# Patient Record
Sex: MALE | HISPANIC OR LATINO | Employment: FULL TIME | URBAN - METROPOLITAN AREA
[De-identification: names, ages, dates, MRNs, and addresses within clinical notes are randomized per-mention and may not be internally consistent; named-entity substitution may affect disease eponyms.]

---

## 2020-10-25 ENCOUNTER — OFFICE VISIT (OUTPATIENT)
Dept: URGENT CARE | Facility: URGENT CARE | Age: 30
End: 2020-10-25
Payer: COMMERCIAL

## 2020-10-25 VITALS
TEMPERATURE: 98.9 F | DIASTOLIC BLOOD PRESSURE: 94 MMHG | HEART RATE: 92 BPM | RESPIRATION RATE: 20 BRPM | WEIGHT: 177 LBS | SYSTOLIC BLOOD PRESSURE: 136 MMHG | OXYGEN SATURATION: 93 %

## 2020-10-25 DIAGNOSIS — J45.901 EXACERBATION OF ASTHMA, UNSPECIFIED ASTHMA SEVERITY, UNSPECIFIED WHETHER PERSISTENT: Primary | ICD-10-CM

## 2020-10-25 PROCEDURE — 99203 OFFICE O/P NEW LOW 30 MIN: CPT | Performed by: FAMILY MEDICINE

## 2020-10-25 RX ORDER — PREDNISONE 20 MG/1
TABLET ORAL
Qty: 30 TABLET | Refills: 0 | Status: SHIPPED | OUTPATIENT
Start: 2020-10-25

## 2020-10-25 RX ORDER — ALBUTEROL SULFATE 90 UG/1
2 AEROSOL, METERED RESPIRATORY (INHALATION) EVERY 4 HOURS PRN
Qty: 1 INHALER | Refills: 3 | Status: SHIPPED | OUTPATIENT
Start: 2020-10-25

## 2020-10-25 NOTE — PROGRESS NOTES
SUBJECTIVE:   Avinash Eden is a 30 year old male with a history of asthma presenting with a chief complaint of chest tightness (about the same at the midline of the chest),  shortness of breath (worse at night and in the mornings).  No cough.  There has been some wheezing present. .    No fevers.   No sick contacts.      Onset of symptoms was five days ago.  Course of illness is about the same. .    Current and Associated symptoms: as listed above.   Treatment measures tried include Albuterol MDI (about 4 hours), Advair, (Patient ran out of this medication earlier this week).  .  Predisposing factors include history of asthma.    Patient was hospitalized for asthma as an infant.  .  .    Past Medical History:    Asthma  Allergic Rhinitis (seasonal allergies, animal dander).      Current Outpatient Medications   Medication Sig Dispense Refill     ALBUTEROL IN        Cetirizine HCl (ZYRTEC PO)        Fluticasone-Salmeterol (ADVAIR HFA IN)        Social History     Tobacco Use     Smoking status: Never Smoker     Smokeless tobacco: Never Used   Substance Use Topics     Alcohol use: Not on file     Social History:  Patient is nonsmoker.  He works at a school and teaches mainly outdoors.  .     Family History:    No major medical problems.     ROS:  CONSTITUTIONAL:NEGATIVE  for fevers.   INTEGUMENTARY/SKIN: negative for rash  EYES: No eye problems.   ENT/MOUTH: positive for a little bit of nasal congestion.  RESP: positive for chest tightness and shortness of breath.   CV: POSITIVE for chest tightness  GI: No nausea/vomiting/diarrhea.   : No urinary problems.   MUSCULOSKELETAL: No body aches.   NEURO: No numbness/weakness.    HEME/ALLERGY/IMMUNE: No easy bleeding/bruising.    PSYCHIATRIC: No current anxiety/depression.      OBJECTIVE:  BP (!) 136/94   Pulse 92   Temp 98.9  F (37.2  C) (Tympanic)   Resp 20   Wt 80.3 kg (177 lb)   SpO2 93%   GENERAL APPEARANCE: healthy, alert and no distress.  Patient can speak in  full sentences.   RESP: expiratory wheezes in the right middle posterior and right lower posterior lung fields.    CV: regular rates and rhythm, normal S1 S2, no murmur noted  SKIN: no suspicious lesions or rashes    ASSESSMENT:  Asthma Exacerbation    PLAN:  Rx:  Prednisone, Advair and Albuterol MDI  Go to the emergency room if you develop worsening, severe shortness of breath.      follow up with a primary care provider in one week       Cisco Bueno MD

## 2020-10-25 NOTE — PATIENT INSTRUCTIONS
Go to the emergency room if you develop worsening, severe shortness of breath.      follow up with a primary care provider in one week

## 2021-05-01 ENCOUNTER — HEALTH MAINTENANCE LETTER (OUTPATIENT)
Age: 31
End: 2021-05-01

## 2021-10-11 ENCOUNTER — HEALTH MAINTENANCE LETTER (OUTPATIENT)
Age: 31
End: 2021-10-11

## 2022-01-27 ENCOUNTER — LAB REQUISITION (OUTPATIENT)
Dept: LAB | Facility: CLINIC | Age: 32
End: 2022-01-27

## 2022-01-27 DIAGNOSIS — Z13.1 ENCOUNTER FOR SCREENING FOR DIABETES MELLITUS: ICD-10-CM

## 2022-01-27 DIAGNOSIS — Z13.220 ENCOUNTER FOR SCREENING FOR LIPOID DISORDERS: ICD-10-CM

## 2022-01-27 LAB
ANION GAP SERPL CALCULATED.3IONS-SCNC: 12 MMOL/L (ref 5–18)
BUN SERPL-MCNC: 15 MG/DL (ref 8–22)
CALCIUM SERPL-MCNC: 10 MG/DL (ref 8.5–10.5)
CHLORIDE BLD-SCNC: 105 MMOL/L (ref 98–107)
CHOLEST SERPL-MCNC: 216 MG/DL
CO2 SERPL-SCNC: 23 MMOL/L (ref 22–31)
CREAT SERPL-MCNC: 0.82 MG/DL (ref 0.7–1.3)
GFR SERPL CREATININE-BSD FRML MDRD: >90 ML/MIN/1.73M2
GLUCOSE BLD-MCNC: 104 MG/DL (ref 70–125)
HDLC SERPL-MCNC: 40 MG/DL
LDLC SERPL CALC-MCNC: 114 MG/DL
POTASSIUM BLD-SCNC: 4.4 MMOL/L (ref 3.5–5)
SODIUM SERPL-SCNC: 140 MMOL/L (ref 136–145)
TRIGL SERPL-MCNC: 311 MG/DL

## 2022-01-27 PROCEDURE — 80048 BASIC METABOLIC PNL TOTAL CA: CPT | Performed by: PHYSICIAN ASSISTANT

## 2022-01-27 PROCEDURE — 80061 LIPID PANEL: CPT | Performed by: PHYSICIAN ASSISTANT

## 2022-02-11 ENCOUNTER — LAB REQUISITION (OUTPATIENT)
Dept: LAB | Facility: CLINIC | Age: 32
End: 2022-02-11
Payer: COMMERCIAL

## 2022-02-11 DIAGNOSIS — U07.1 COVID-19: ICD-10-CM

## 2022-05-22 ENCOUNTER — HEALTH MAINTENANCE LETTER (OUTPATIENT)
Age: 32
End: 2022-05-22

## 2022-09-24 ENCOUNTER — HEALTH MAINTENANCE LETTER (OUTPATIENT)
Age: 32
End: 2022-09-24

## 2022-11-07 ENCOUNTER — APPOINTMENT (OUTPATIENT)
Dept: RADIOLOGY | Facility: CLINIC | Age: 32
End: 2022-11-07
Attending: EMERGENCY MEDICINE
Payer: COMMERCIAL

## 2022-11-07 ENCOUNTER — HOSPITAL ENCOUNTER (EMERGENCY)
Facility: CLINIC | Age: 32
Discharge: HOME | End: 2022-11-07
Attending: EMERGENCY MEDICINE | Admitting: EMERGENCY MEDICINE
Payer: COMMERCIAL

## 2022-11-07 VITALS
BODY MASS INDEX: 29.23 KG/M2 | OXYGEN SATURATION: 95 % | RESPIRATION RATE: 18 BRPM | HEART RATE: 100 BPM | SYSTOLIC BLOOD PRESSURE: 144 MMHG | DIASTOLIC BLOOD PRESSURE: 90 MMHG | WEIGHT: 165 LBS | TEMPERATURE: 99.8 F | HEIGHT: 63 IN

## 2022-11-07 DIAGNOSIS — J10.1 INFLUENZA A: ICD-10-CM

## 2022-11-07 LAB
ALBUMIN SERPL-MCNC: 4.3 G/DL (ref 3.5–5)
ALP SERPL-CCNC: 65 U/L (ref 45–120)
ALT SERPL W P-5'-P-CCNC: 106 U/L (ref 0–45)
ANION GAP SERPL CALCULATED.3IONS-SCNC: 14 MMOL/L (ref 5–18)
AST SERPL W P-5'-P-CCNC: 44 U/L (ref 0–40)
BASE EXCESS BLDV CALC-SCNC: 2 MMOL/L
BASOPHILS # BLD AUTO: 0.1 10E3/UL (ref 0–0.2)
BASOPHILS NFR BLD AUTO: 1 %
BILIRUB SERPL-MCNC: 0.6 MG/DL (ref 0–1)
BUN SERPL-MCNC: 11 MG/DL (ref 8–22)
C REACTIVE PROTEIN LHE: 10.2 MG/DL (ref 0–?)
CALCIUM SERPL-MCNC: 9.7 MG/DL (ref 8.5–10.5)
CHLORIDE BLD-SCNC: 100 MMOL/L (ref 98–107)
CO2 SERPL-SCNC: 23 MMOL/L (ref 22–31)
CREAT SERPL-MCNC: 1.08 MG/DL (ref 0.7–1.3)
EOSINOPHIL # BLD AUTO: 0 10E3/UL (ref 0–0.7)
EOSINOPHIL NFR BLD AUTO: 0 %
ERYTHROCYTE [DISTWIDTH] IN BLOOD BY AUTOMATED COUNT: 11.7 % (ref 10–15)
FLUAV RNA SPEC QL NAA+PROBE: POSITIVE
FLUBV RNA RESP QL NAA+PROBE: NEGATIVE
GFR SERPL CREATININE-BSD FRML MDRD: >90 ML/MIN/1.73M2
GLUCOSE BLD-MCNC: 119 MG/DL (ref 70–125)
HCO3 BLDV-SCNC: 27 MMOL/L (ref 24–30)
HCT VFR BLD AUTO: 47.9 % (ref 40–53)
HGB BLD-MCNC: 16.1 G/DL (ref 13.3–17.7)
IMM GRANULOCYTES # BLD: 0.1 10E3/UL
IMM GRANULOCYTES NFR BLD: 1 %
INR PPP: 1.16 (ref 0.85–1.15)
LYMPHOCYTES # BLD AUTO: 1.4 10E3/UL (ref 0.8–5.3)
LYMPHOCYTES NFR BLD AUTO: 11 %
MCH RBC QN AUTO: 31.1 PG (ref 26.5–33)
MCHC RBC AUTO-ENTMCNC: 33.6 G/DL (ref 31.5–36.5)
MCV RBC AUTO: 93 FL (ref 78–100)
MONOCYTES # BLD AUTO: 1 10E3/UL (ref 0–1.3)
MONOCYTES NFR BLD AUTO: 7 %
NEUTROPHILS # BLD AUTO: 10.4 10E3/UL (ref 1.6–8.3)
NEUTROPHILS NFR BLD AUTO: 80 %
NRBC # BLD AUTO: 0 10E3/UL
NRBC BLD AUTO-RTO: 0 /100
OXYHGB MFR BLDV: 46.1 % (ref 70–75)
PCO2 BLDV: 42 MM HG (ref 35–50)
PH BLDV: 7.41 [PH] (ref 7.35–7.45)
PLATELET # BLD AUTO: 285 10E3/UL (ref 150–450)
PO2 BLDV: 24 MM HG (ref 25–47)
POTASSIUM BLD-SCNC: 3.7 MMOL/L (ref 3.5–5)
PROT SERPL-MCNC: 8.5 G/DL (ref 6–8)
RBC # BLD AUTO: 5.18 10E6/UL (ref 4.4–5.9)
RSV RNA SPEC NAA+PROBE: NEGATIVE
SAO2 % BLDV: 46.9 % (ref 70–75)
SARS-COV-2 RNA RESP QL NAA+PROBE: NEGATIVE
SODIUM SERPL-SCNC: 137 MMOL/L (ref 136–145)
TROPONIN I SERPL-MCNC: <0.01 NG/ML (ref 0–0.29)
WBC # BLD AUTO: 12.9 10E3/UL (ref 4–11)

## 2022-11-07 PROCEDURE — C9803 HOPD COVID-19 SPEC COLLECT: HCPCS

## 2022-11-07 PROCEDURE — 99285 EMERGENCY DEPT VISIT HI MDM: CPT | Mod: CS,25

## 2022-11-07 PROCEDURE — 87637 SARSCOV2&INF A&B&RSV AMP PRB: CPT | Performed by: EMERGENCY MEDICINE

## 2022-11-07 PROCEDURE — 36415 COLL VENOUS BLD VENIPUNCTURE: CPT | Performed by: EMERGENCY MEDICINE

## 2022-11-07 PROCEDURE — 250N000011 HC RX IP 250 OP 636: Performed by: EMERGENCY MEDICINE

## 2022-11-07 PROCEDURE — 82805 BLOOD GASES W/O2 SATURATION: CPT | Performed by: EMERGENCY MEDICINE

## 2022-11-07 PROCEDURE — 258N000003 HC RX IP 258 OP 636: Performed by: EMERGENCY MEDICINE

## 2022-11-07 PROCEDURE — 71046 X-RAY EXAM CHEST 2 VIEWS: CPT

## 2022-11-07 PROCEDURE — 84484 ASSAY OF TROPONIN QUANT: CPT | Performed by: EMERGENCY MEDICINE

## 2022-11-07 PROCEDURE — 80053 COMPREHEN METABOLIC PANEL: CPT | Performed by: EMERGENCY MEDICINE

## 2022-11-07 PROCEDURE — 85025 COMPLETE CBC W/AUTO DIFF WBC: CPT | Performed by: EMERGENCY MEDICINE

## 2022-11-07 PROCEDURE — 86140 C-REACTIVE PROTEIN: CPT | Performed by: EMERGENCY MEDICINE

## 2022-11-07 PROCEDURE — 250N000013 HC RX MED GY IP 250 OP 250 PS 637: Performed by: EMERGENCY MEDICINE

## 2022-11-07 PROCEDURE — 85610 PROTHROMBIN TIME: CPT | Performed by: EMERGENCY MEDICINE

## 2022-11-07 PROCEDURE — 93005 ELECTROCARDIOGRAM TRACING: CPT | Performed by: EMERGENCY MEDICINE

## 2022-11-07 PROCEDURE — 96361 HYDRATE IV INFUSION ADD-ON: CPT

## 2022-11-07 PROCEDURE — 96374 THER/PROPH/DIAG INJ IV PUSH: CPT

## 2022-11-07 RX ORDER — ONDANSETRON 4 MG/1
4 TABLET, ORALLY DISINTEGRATING ORAL EVERY 8 HOURS PRN
Qty: 10 TABLET | Refills: 0 | Status: SHIPPED | OUTPATIENT
Start: 2022-11-07 | End: 2022-11-10

## 2022-11-07 RX ORDER — OSELTAMIVIR PHOSPHATE 75 MG/1
75 CAPSULE ORAL ONCE
Status: COMPLETED | OUTPATIENT
Start: 2022-11-07 | End: 2022-11-07

## 2022-11-07 RX ORDER — ACETAMINOPHEN 325 MG/1
975 TABLET ORAL ONCE
Status: COMPLETED | OUTPATIENT
Start: 2022-11-07 | End: 2022-11-07

## 2022-11-07 RX ORDER — OSELTAMIVIR PHOSPHATE 75 MG/1
75 CAPSULE ORAL 2 TIMES DAILY
Qty: 10 CAPSULE | Refills: 0 | Status: SHIPPED | OUTPATIENT
Start: 2022-11-07 | End: 2022-11-12

## 2022-11-07 RX ORDER — KETOROLAC TROMETHAMINE 15 MG/ML
15 INJECTION, SOLUTION INTRAMUSCULAR; INTRAVENOUS ONCE
Status: COMPLETED | OUTPATIENT
Start: 2022-11-07 | End: 2022-11-07

## 2022-11-07 RX ORDER — BENZONATATE 100 MG/1
100 CAPSULE ORAL 3 TIMES DAILY PRN
Qty: 30 CAPSULE | Refills: 0 | Status: SHIPPED | OUTPATIENT
Start: 2022-11-07

## 2022-11-07 RX ORDER — HYDROCODONE BITARTRATE AND ACETAMINOPHEN 5; 325 MG/1; MG/1
1-2 TABLET ORAL EVERY 4 HOURS PRN
Qty: 18 TABLET | Refills: 0 | Status: SHIPPED | OUTPATIENT
Start: 2022-11-07 | End: 2022-11-10

## 2022-11-07 RX ADMIN — KETOROLAC TROMETHAMINE 15 MG: 15 INJECTION, SOLUTION INTRAMUSCULAR; INTRAVENOUS at 01:24

## 2022-11-07 RX ADMIN — OSELTAMIVIR PHOSPHATE 75 MG: 75 CAPSULE ORAL at 02:36

## 2022-11-07 RX ADMIN — SODIUM CHLORIDE 1000 ML: 9 INJECTION, SOLUTION INTRAVENOUS at 01:23

## 2022-11-07 RX ADMIN — ACETAMINOPHEN 975 MG: 325 TABLET, FILM COATED ORAL at 01:24

## 2022-11-07 ASSESSMENT — ACTIVITIES OF DAILY LIVING (ADL): ADLS_ACUITY_SCORE: 35

## 2022-11-07 NOTE — ED PROVIDER NOTES
EMERGENCY DEPARTMENT ENCOUnter      NAME: Avinash Eden  AGE: 32 year old male  YOB: 1990  MRN: [id number]  EVALUATION DATE & TIME: 11/7/2022  1:08 AM    PCP: No Ref-Primary, Physician    ED PROVIDER: Sravani Owen MD      Chief Complaint   Patient presents with     Chest Pain         FINAL IMPRESSION:  1. Influenza A          ED COURSE & MEDICAL DECISION MAKING:      In summary, the patient is a 32-year-old male that presents to the emergency department for evaluation of chest pain, shortness of breath, cough, and fever thought secondary to influenza A.  We will treat with Tamiflu since his symptoms recently started.  He has no evidence of pneumonia, pneumothorax, or other more serious etiologies of chest pain.  1:14 AM I met with the patient, obtained history, performed an initial exam, and discussed options and plan for diagnostics and treatment here in the ED. normal saline 1 L IV was administered for IV hydration.  Toradol 15 mg IV and Tylenol 975 mg p.o. was administered for pain and antipyresis.  2:15 AM I updated patient.  Tamiflu 75 mg p.o. was administered.  Reevaluation revealed that his pain and symptoms were much improved in the emergency department.      At the conclusion of the encounter I discussed the results of all of the tests and the disposition. The questions were answered. The patient or family acknowledged understanding and was agreeable with the care plan.         MEDICATIONS GIVEN IN THE EMERGENCY:  Medications   0.9% sodium chloride BOLUS (0 mLs Intravenous Stopped 11/7/22 0308)   ketorolac (TORADOL) injection 15 mg (15 mg Intravenous Given 11/7/22 0124)   acetaminophen (TYLENOL) tablet 975 mg (975 mg Oral Given 11/7/22 0124)   oseltamivir (TAMIFLU) capsule 75 mg (75 mg Oral Given 11/7/22 0236)       NEW PRESCRIPTIONS STARTED AT TODAY'S ER VISIT  Discharge Medication List as of 11/7/2022  3:16 AM      START taking these medications    Details   benzonatate (TESSALON)  "100 MG capsule Take 1 capsule (100 mg) by mouth 3 times daily as needed for cough, Disp-30 capsule, R-0, Local Print      HYDROcodone-acetaminophen (NORCO) 5-325 MG tablet Take 1-2 tablets by mouth every 4 hours as needed for moderate to severe pain (or cough), Disp-18 tablet, R-0, Local Print      ondansetron (ZOFRAN ODT) 4 MG ODT tab Take 1 tablet (4 mg) by mouth every 8 hours as needed for nausea or vomiting, Disp-10 tablet, R-0, Local Print      oseltamivir (TAMIFLU) 75 MG capsule Take 1 capsule (75 mg) by mouth 2 times daily for 5 days, Disp-10 capsule, R-0, Local Print                =================================================================    HPI        Avinash Eden is a 32 year old male with no pertinent history who presents to this ED via walk-in for evaluation of multiple complaints.    Patient reports ongoing fever (102-101F), cough, shortness of breath, generalized weakness, and chest pain for 4 days (11/4). Inside the ED, he feels like he's going to pass out. Patient describes his chest pain like his \"lungs are working harder than they should be\" and has a productive cough with \"super dark\" phlegm. He notes having diarrhea as well. Patient works as a . He endorses no recent sick contacts to his knowledge. Denies vomiting and any other symptoms. Patient is not a smoker. He drinks alcohol in moderation.     Of note, patient has a history of asthma. No other known medical history of surgeries.       REVIEW OF SYSTEMS     Constitutional:  Denies chills. Positive for fever (102-101)  HENT:  Denies sore throat   Respiratory:  Positive for cough(dark phlegm) and shortness of breath   Cardiovascular:  Denies palpitations. Positive for chest pain.  GI:  Denies abdominal pain, nausea, or vomiting. Positive for diarrhea.   Musculoskeletal:  Denies any new extremity pain   Skin:  Denies rash   Neurologic:  Denies headache, focal weakness or sensory changes . Positive for generalized weakness. " "  All other systems reviewed and are negative      PAST MEDICAL HISTORY:  History reviewed. No pertinent past medical history.    PAST SURGICAL HISTORY:  History reviewed. No pertinent surgical history.        CURRENT MEDICATIONS:    benzonatate (TESSALON) 100 MG capsule  HYDROcodone-acetaminophen (NORCO) 5-325 MG tablet  ondansetron (ZOFRAN ODT) 4 MG ODT tab  oseltamivir (TAMIFLU) 75 MG capsule  albuterol (PROAIR HFA/PROVENTIL HFA/VENTOLIN HFA) 108 (90 Base) MCG/ACT inhaler  ALBUTEROL IN  Cetirizine HCl (ZYRTEC PO)  Fluticasone-Salmeterol (ADVAIR HFA IN)  fluticasone-salmeterol (ADVAIR) 250-50 MCG/DOSE inhaler  predniSONE (DELTASONE) 20 MG tablet        ALLERGIES:  Allergies   Allergen Reactions     Seasonal Allergies        FAMILY HISTORY:  No family history on file.    SOCIAL HISTORY:   Social History     Socioeconomic History     Marital status: Single     Spouse name: None     Number of children: None     Years of education: None     Highest education level: None   Tobacco Use     Smoking status: Never     Smokeless tobacco: Never       VITALS:  Patient Vitals for the past 24 hrs:   BP Temp Temp src Pulse Resp SpO2 Height Weight   11/07/22 0307 (!) 144/90 99.8  F (37.7  C) Temporal 100 18 95 % -- --   11/07/22 0105 (!) 158/96 (!) 101  F (38.3  C) Temporal (!) 136 18 92 % 1.6 m (5' 3\") 74.8 kg (165 lb)       PHYSICAL EXAM    Constitutional:  Well developed, Well nourished,  HENT:  Normocephalic, Atraumatic, Bilateral external ears normal, Oropharynx moist, Nose normal.   Neck:  Normal range of motion, No meningismus, No stridor.   Eyes:  EOMI, Conjunctiva normal, No discharge.   Respiratory:  Normal breath sounds, No respiratory distress, No wheezing, No chest tenderness.   Cardiovascular: Tachycardic, Normal rhythm, No murmurs  GI:  Soft, No tenderness, No guarding, No CVA tenderness.   Musculoskeletal:  Neurovascularly intact distally, No edema, No tenderness, No cyanosis, Good range of motion in all major " joints.   Integument:  Warm, Dry, No erythema, No rash.   Lymphatic:  No lymphadenopathy noted.   Neurologic:  Alert & oriented x 3, Normal motor function,  No focal deficits noted.   Psychiatric:  Affect normal, Judgment normal, Mood normal.      LAB:  All pertinent labs reviewed and interpreted.  Results for orders placed or performed during the hospital encounter of 11/07/22   Chest XR,  PA & LAT    Impression    IMPRESSION: Negative chest.   Result Value Ref Range    INR 1.16 (H) 0.85 - 1.15   Comprehensive metabolic panel   Result Value Ref Range    Sodium 137 136 - 145 mmol/L    Potassium 3.7 3.5 - 5.0 mmol/L    Chloride 100 98 - 107 mmol/L    Carbon Dioxide (CO2) 23 22 - 31 mmol/L    Anion Gap 14 5 - 18 mmol/L    Urea Nitrogen 11 8 - 22 mg/dL    Creatinine 1.08 0.70 - 1.30 mg/dL    Calcium 9.7 8.5 - 10.5 mg/dL    Glucose 119 70 - 125 mg/dL    Alkaline Phosphatase 65 45 - 120 U/L    AST 44 (H) 0 - 40 U/L     (H) 0 - 45 U/L    Protein Total 8.5 (H) 6.0 - 8.0 g/dL    Albumin 4.3 3.5 - 5.0 g/dL    Bilirubin Total 0.6 0.0 - 1.0 mg/dL    GFR Estimate >90 >60 mL/min/1.73m2   Result Value Ref Range    Troponin I <0.01 0.00 - 0.29 ng/mL   CRP inflammation   Result Value Ref Range    CRP 10.2 (H) 0.0 - <0.8 mg/dL   Symptomatic; Unknown Influenza A/B & SARS-CoV2 (COVID-19) Virus PCR Multiplex Nasopharyngeal    Specimen: Nasopharyngeal; Swab   Result Value Ref Range    Influenza A PCR Positive (A) Negative    Influenza B PCR Negative Negative    RSV PCR Negative Negative    SARS CoV2 PCR Negative Negative   Blood gas venous   Result Value Ref Range    pH Venous 7.41 7.35 - 7.45    pCO2 Venous 42 35 - 50 mm Hg    pO2 Venous 24 (L) 25 - 47 mm Hg    Bicarbonate Venous 27 24 - 30 mmol/L    Base Excess/Deficit (+/-) 2.0   mmol/L    Oxyhemoglobin Venous 46.1 (L) 70.0 - 75.0 %    O2 Sat, Venous 46.9 (L) 70.0 - 75.0 %   CBC with platelets and differential   Result Value Ref Range    WBC Count 12.9 (H) 4.0 - 11.0 10e3/uL     RBC Count 5.18 4.40 - 5.90 10e6/uL    Hemoglobin 16.1 13.3 - 17.7 g/dL    Hematocrit 47.9 40.0 - 53.0 %    MCV 93 78 - 100 fL    MCH 31.1 26.5 - 33.0 pg    MCHC 33.6 31.5 - 36.5 g/dL    RDW 11.7 10.0 - 15.0 %    Platelet Count 285 150 - 450 10e3/uL    % Neutrophils 80 %    % Lymphocytes 11 %    % Monocytes 7 %    % Eosinophils 0 %    % Basophils 1 %    % Immature Granulocytes 1 %    NRBCs per 100 WBC 0 <1 /100    Absolute Neutrophils 10.4 (H) 1.6 - 8.3 10e3/uL    Absolute Lymphocytes 1.4 0.8 - 5.3 10e3/uL    Absolute Monocytes 1.0 0.0 - 1.3 10e3/uL    Absolute Eosinophils 0.0 0.0 - 0.7 10e3/uL    Absolute Basophils 0.1 0.0 - 0.2 10e3/uL    Absolute Immature Granulocytes 0.1 <=0.4 10e3/uL    Absolute NRBCs 0.0 10e3/uL   ECG 12-LEAD WITH MUSE (LHE)   Result Value Ref Range    Systolic Blood Pressure  mmHg    Diastolic Blood Pressure  mmHg    Ventricular Rate 135 BPM    Atrial Rate 135 BPM    AR Interval 150 ms    QRS Duration 86 ms     ms    QTc 420 ms    P Axis 32 degrees    R AXIS 100 degrees    T Axis 23 degrees    Interpretation ECG       Sinus tachycardia  Rightward axis  Cannot rule out Inferior infarct , age undetermined  Abnormal ECG  No previous ECGs available         RADIOLOGY:  I have independently reviewed and interpreted the above imaging, pending the final radiology read.  Chest XR,  PA & LAT   Final Result   IMPRESSION: Negative chest.          [id number], sinus, no st segment elevation or depression  I have independently reviewed and interpreted this EKG          I, Funmilayo Menendez, am serving as a scribe to document services personally performed by Dr. Owen based on my observation and the provider's statements to me. I, Sravani Owen MD attest that Funmilayo Menendez is acting in a scribe capacity, has observed my performance of the services and has documented them in accordance with my direction.    Sravani Owen MD  Emergency Medicine  [facility]  [facility] EMERGENCY ROOM  [address] [address]  [phone]  Dept: [phone]       Sravani Owen MD  11/07/22 9231

## 2022-11-07 NOTE — Clinical Note
Avinash Eden was seen and treated in our emergency department on 11/7/2022.  He may return to work on 11/14/2022.  Unable to work due to influenza illness. May return when fever free for 24 hours.     If you have any questions or concerns, please don't hesitate to call.      Sravani Owen MD

## 2022-11-07 NOTE — DISCHARGE INSTRUCTIONS
Ibuprofen 600 mg every 6 hours as needed for fever or pain  Dramamine as needed for nausea or vomiting  Return to the emergency department for worsening problems or concerns

## 2022-11-07 NOTE — ED TRIAGE NOTES
Here for flu like symptoms including cough, fever, shortness of breath that all started on Friday   Reporting chest pain that started today while resting   Works at a school      Triage Assessment     Row Name 11/07/22 0107       Triage Assessment (Adult)    Airway WDL WDL       Respiratory WDL    Respiratory WDL X    Rhythm/Pattern, Respiratory tachypneic;shortness of breath       Skin Circulation/Temperature WDL    Skin Circulation/Temperature WDL X;temperature    Skin Temperature warm       Cardiac WDL    Cardiac WDL X;chest pain;rhythm    Pulse Rate & Regularity tachycardic    Cardiac Rhythm ST       Chest Pain Assessment    Chest Pain Location midsternal       Peripheral/Neurovascular WDL    Peripheral Neurovascular WDL WDL       Cognitive/Neuro/Behavioral WDL    Cognitive/Neuro/Behavioral WDL WDL

## 2023-01-12 LAB
ATRIAL RATE - MUSE: 135 BPM
DIASTOLIC BLOOD PRESSURE - MUSE: NORMAL MMHG
INTERPRETATION ECG - MUSE: NORMAL
P AXIS - MUSE: 32 DEGREES
PR INTERVAL - MUSE: 150 MS
QRS DURATION - MUSE: 86 MS
QT - MUSE: 280 MS
QTC - MUSE: 420 MS
R AXIS - MUSE: 100 DEGREES
SYSTOLIC BLOOD PRESSURE - MUSE: NORMAL MMHG
T AXIS - MUSE: 23 DEGREES
VENTRICULAR RATE- MUSE: 135 BPM

## 2023-06-04 ENCOUNTER — HEALTH MAINTENANCE LETTER (OUTPATIENT)
Age: 33
End: 2023-06-04

## 2023-12-23 ENCOUNTER — HOSPITAL ENCOUNTER (EMERGENCY)
Facility: CLINIC | Age: 33
Discharge: HOME | End: 2023-12-23
Attending: EMERGENCY MEDICINE | Admitting: EMERGENCY MEDICINE
Payer: COMMERCIAL

## 2023-12-23 VITALS
SYSTOLIC BLOOD PRESSURE: 142 MMHG | HEIGHT: 63 IN | DIASTOLIC BLOOD PRESSURE: 100 MMHG | WEIGHT: 165 LBS | RESPIRATION RATE: 18 BRPM | OXYGEN SATURATION: 95 % | BODY MASS INDEX: 29.23 KG/M2 | HEART RATE: 84 BPM | TEMPERATURE: 98.4 F

## 2023-12-23 DIAGNOSIS — S61.012A THUMB LACERATION, LEFT, INITIAL ENCOUNTER: ICD-10-CM

## 2023-12-23 PROCEDURE — 99283 EMERGENCY DEPT VISIT LOW MDM: CPT

## 2023-12-23 PROCEDURE — 12001 RPR S/N/AX/GEN/TRNK 2.5CM/<: CPT

## 2023-12-23 PROCEDURE — 99282 EMERGENCY DEPT VISIT SF MDM: CPT

## 2023-12-23 ASSESSMENT — ACTIVITIES OF DAILY LIVING (ADL): ADLS_ACUITY_SCORE: 35

## 2023-12-24 NOTE — ED TRIAGE NOTES
States cut L thumb with kitchen knife 40 minutes ago. Tip of thumb laceration with skin flap, bleeding controlled dressing applied in triage

## 2023-12-24 NOTE — ED PROVIDER NOTES
"  History     Chief Complaint:  Laceration       HPI   Avinash Eden is a 33 year old male patient presenting today with a left thumb laceration.  He states that he was cutting some peppers and cut his finger.  States that bleeding is controlled.  Tetanus was last updated a year ago.  Patient reports no other injuries.    Independent Historian:   None - Patient Only    Review of External Notes:   Reviewed [facility] Tdap updated January 27, 2022      Medications:    albuterol (PROAIR HFA/PROVENTIL HFA/VENTOLIN HFA) 108 (90 Base) MCG/ACT inhaler  ALBUTEROL IN  benzonatate (TESSALON) 100 MG capsule  Cetirizine HCl (ZYRTEC PO)  Fluticasone-Salmeterol (ADVAIR HFA IN)  fluticasone-salmeterol (ADVAIR) 250-50 MCG/DOSE inhaler  predniSONE (DELTASONE) 20 MG tablet        Past Medical History:    Past Medical History:   Diagnosis Date    Uncomplicated asthma        Past Surgical History:    History reviewed. No pertinent surgical history.     Physical Exam   Patient Vitals for the past 24 hrs:   BP Temp Temp src Pulse Resp SpO2 Height Weight   12/23/23 2142 (!) 142/100 98.4  F (36.9  C) Temporal 84 18 95 % 1.6 m (5' 3\") 74.8 kg (165 lb)        Physical Exam  Vitals reviewed.   Constitutional:       General: He is not in acute distress.     Appearance: He is not diaphoretic.   HENT:      Head: Normocephalic and atraumatic.      Mouth/Throat:      Mouth: Mucous membranes are moist.   Eyes:      Extraocular Movements: Extraocular movements intact.      Pupils: Pupils are equal, round, and reactive to light.   Musculoskeletal:         General: Normal range of motion.      Comments: Distal left thumb with a flap-like laceration no active bleeding and appears superficial.  Laceration measures less than 1 cm   Skin:     General: Skin is warm and dry.      Capillary Refill: Capillary refill takes less than 2 seconds.   Neurological:      General: No focal deficit present.      Mental Status: He is alert and oriented to person, place, and " time.   Psychiatric:         Mood and Affect: Mood normal.         Behavior: Behavior normal.           Emergency Department Course     Imaging:  No orders to display          Laboratory:  Labs Ordered and Resulted from Time of ED Arrival to Time of ED Departure - No data to display     Procedures     Laceration Repair      Procedure: Laceration Repair    Indication: Laceration    Consent: Verbal    Location: Left Hand Thumb    Length: 1 cm    Preparation: Irrigation with Sterile Saline.    Anesthesia/Sedation: Lidocaine - 1%      Treatment/Exploration: Wound explored, no foreign bodies found     Closure: The wound was closed with one layer. Skin/superficial layer was closed with 4 x 5-0 Fast gut absorbable  using Interrupted sutures.     Patient Status: The patient tolerated the procedure well: Yes. There were no complications.      Emergency Department Course & Assessments:           Interventions:  Medications - No data to display         Social Determinants of Health affecting care:   None    Disposition:  The patient was discharged to home.     Impression & Plan      Medical Decision Makin-year-old male presenting today with a left thumb laceration.  He cut his finger with a knife while cutting peppers.  He reports tetanus is up-to-date.  No other lacerations.  He is noted to have a distal thumb laceration that is flap-like.  Less than 1 cm.  Wound was irrigated with normal saline and to be gaping.  I discussed with him wound repair with sutures versus no repair.  I explained to him that given that the laceration is so distal that the flap may not reviewed here and revascularized.  He verbalized understanding would like to proceed with the laceration repair.  Patient tolerated wound repair without any difficulty.  Discussed with him wound care instructions.  Discussed care instructions and return precautions.  He verbalized understanding and agreement.  Stable for discharge.      Diagnosis:    ICD-10-CM     1. Thumb laceration, left, initial encounter  S61.012A            Discharge Medications:  Discharge Medication List as of 12/23/2023 11:14 PM             Shaista Centeno DO  12/23/2023   Shaista Centeno DO Doan, DO Shaista  12/23/23 6555

## 2023-12-24 NOTE — DISCHARGE INSTRUCTIONS
You have 4 absorbable sutures in place these will dissolve on their own    Keep the wound clean and dry    Follow-up with your doctor as needed    Watch for any signs of infection    Return to the ER for any signs of infection, or any concerning symptoms

## 2024-07-14 ENCOUNTER — HEALTH MAINTENANCE LETTER (OUTPATIENT)
Age: 34
End: 2024-07-14

## 2025-07-19 ENCOUNTER — HEALTH MAINTENANCE LETTER (OUTPATIENT)
Age: 35
End: 2025-07-19